# Patient Record
Sex: FEMALE
[De-identification: names, ages, dates, MRNs, and addresses within clinical notes are randomized per-mention and may not be internally consistent; named-entity substitution may affect disease eponyms.]

---

## 2020-07-02 ENCOUNTER — APPOINTMENT (OUTPATIENT)
Dept: ENDOCRINOLOGY | Facility: CLINIC | Age: 45
End: 2020-07-02
Payer: COMMERCIAL

## 2020-07-02 VITALS
DIASTOLIC BLOOD PRESSURE: 78 MMHG | HEART RATE: 74 BPM | SYSTOLIC BLOOD PRESSURE: 121 MMHG | BODY MASS INDEX: 22.66 KG/M2 | WEIGHT: 141 LBS | HEIGHT: 66 IN

## 2020-07-02 DIAGNOSIS — Z86.39 PERSONAL HISTORY OF OTHER ENDOCRINE, NUTRITIONAL AND METABOLIC DISEASE: ICD-10-CM

## 2020-07-02 DIAGNOSIS — Z78.9 OTHER SPECIFIED HEALTH STATUS: ICD-10-CM

## 2020-07-02 DIAGNOSIS — H81.10 BENIGN PAROXYSMAL VERTIGO, UNSPECIFIED EAR: ICD-10-CM

## 2020-07-02 PROCEDURE — 99204 OFFICE O/P NEW MOD 45 MIN: CPT

## 2020-07-02 RX ORDER — LEVOTHYROXINE SODIUM 0.05 MG/1
50 TABLET ORAL
Refills: 0 | Status: ACTIVE | COMMUNITY

## 2020-07-02 NOTE — REASON FOR VISIT
[Initial Evaluation] : an initial evaluation [Hypothyroidism] : hypothyroidism [Menopause Symptoms] : menopause symptoms [Other___] : [unfilled]

## 2020-07-02 NOTE — HISTORY OF PRESENT ILLNESS
[FreeTextEntry1] : 44 y.o. female who was found to have minimally elevated TSH (around 5 miu/ml) 11 yrs ago after pregnancy.\par No tx was initiated.\par Subsequently TSH was found to fluctuate between 9 and 11.\par Tx with levothyroxine 0.05 mg was initiated about 1 month ago.\par TSH was 1.5 on 7/1/2020.\par She also c/o amenorrhea (LMP 3/29/20).\par Lab. tests on 6/30 2020 are c/w menopause; PRL was 8.\par During the last 2 days she also developed positional vertigo and was prescribe Antivert (?) in the ER - improving.

## 2020-07-02 NOTE — PHYSICAL EXAM
[No Acute Distress] : no acute distress [Well Nourished] : well nourished [Alert] : alert [Normal Sclera/Conjunctiva] : normal sclera/conjunctiva [EOMI] : extra ocular movement intact [Well Developed] : well developed [No Proptosis] : no proptosis [Normal Oropharynx] : the oropharynx was normal [Thyroid Not Enlarged] : the thyroid was not enlarged [No Thyroid Nodules] : no palpable thyroid nodules [No Respiratory Distress] : no respiratory distress [No Accessory Muscle Use] : no accessory muscle use [Clear to Auscultation] : lungs were clear to auscultation bilaterally [Normal S1, S2] : normal S1 and S2 [Regular Rhythm] : with a regular rhythm [Normal Rate] : heart rate was normal [Normal Bowel Sounds] : normal bowel sounds [No Edema] : no peripheral edema [Pedal Pulses Normal] : the pedal pulses are present [Not Tender] : non-tender [Not Distended] : not distended [Soft] : abdomen soft [Normal Posterior Cervical Nodes] : no posterior cervical lymphadenopathy [Normal Anterior Cervical Nodes] : no anterior cervical lymphadenopathy [No Spinal Tenderness] : no spinal tenderness [No Stigmata of Cushings Syndrome] : no stigmata of Cushings Syndrome [Spine Straight] : spine straight [Normal Gait] : normal gait [Normal Strength/Tone] : muscle strength and tone were normal [Acanthosis Nigricans] : no acanthosis nigricans [No Rash] : no rash [No Tremors] : no tremors [Normal Reflexes] : deep tendon reflexes were 2+ and symmetric [Oriented x3] : oriented to person, place, and time

## 2020-07-02 NOTE — ASSESSMENT
[FreeTextEntry1] : Primary hypothyroidism; menopause; benign positional vertigo.\par \par Would obtain anti-thyroid AB\par Thyroid u/sound\par Bone density\par Levothyroxine 0.05 mg refilled\par Information re [place] thyroid provided at the patient's request\par F/u once the above results are available

## 2020-08-12 ENCOUNTER — TRANSCRIPTION ENCOUNTER (OUTPATIENT)
Age: 45
End: 2020-08-12

## 2020-08-13 ENCOUNTER — TRANSCRIPTION ENCOUNTER (OUTPATIENT)
Age: 45
End: 2020-08-13

## 2020-09-18 ENCOUNTER — TRANSCRIPTION ENCOUNTER (OUTPATIENT)
Age: 45
End: 2020-09-18

## 2020-09-22 ENCOUNTER — TRANSCRIPTION ENCOUNTER (OUTPATIENT)
Age: 45
End: 2020-09-22

## 2020-09-23 ENCOUNTER — TRANSCRIPTION ENCOUNTER (OUTPATIENT)
Age: 45
End: 2020-09-23

## 2020-10-20 ENCOUNTER — APPOINTMENT (OUTPATIENT)
Dept: ENDOCRINOLOGY | Facility: CLINIC | Age: 45
End: 2020-10-20

## 2020-10-23 ENCOUNTER — TRANSCRIPTION ENCOUNTER (OUTPATIENT)
Age: 45
End: 2020-10-23

## 2020-10-26 ENCOUNTER — TRANSCRIPTION ENCOUNTER (OUTPATIENT)
Age: 45
End: 2020-10-26

## 2020-10-27 ENCOUNTER — TRANSCRIPTION ENCOUNTER (OUTPATIENT)
Age: 45
End: 2020-10-27

## 2020-10-29 ENCOUNTER — TRANSCRIPTION ENCOUNTER (OUTPATIENT)
Age: 45
End: 2020-10-29

## 2020-11-16 ENCOUNTER — TRANSCRIPTION ENCOUNTER (OUTPATIENT)
Age: 45
End: 2020-11-16

## 2020-11-23 ENCOUNTER — TRANSCRIPTION ENCOUNTER (OUTPATIENT)
Age: 45
End: 2020-11-23

## 2020-11-25 ENCOUNTER — TRANSCRIPTION ENCOUNTER (OUTPATIENT)
Age: 45
End: 2020-11-25

## 2020-12-22 ENCOUNTER — APPOINTMENT (OUTPATIENT)
Dept: ENDOCRINOLOGY | Facility: CLINIC | Age: 45
End: 2020-12-22
Payer: COMMERCIAL

## 2020-12-22 DIAGNOSIS — Z00.00 ENCOUNTER FOR GENERAL ADULT MEDICAL EXAMINATION W/OUT ABNORMAL FINDINGS: ICD-10-CM

## 2020-12-22 PROCEDURE — 99214 OFFICE O/P EST MOD 30 MIN: CPT | Mod: 95

## 2020-12-22 NOTE — REVIEW OF SYSTEMS
[As Noted in HPI] : as noted in HPI [Negative] : Heme/Lymph [FreeTextEntry2] : joint pains and possible food allergies

## 2020-12-22 NOTE — REASON FOR VISIT
[Home] : at home, [unfilled] , at the time of the visit. [Medical Office: ([facility])___] : at the medical office located in  [Verbal consent obtained from patient] : the patient, [unfilled] [Hypothyroidism] : hypothyroidism

## 2020-12-22 NOTE — ASSESSMENT
[FreeTextEntry1] : The patient's thyroid hormone dose seems appropriate at this time - will continue same.\par Thyroid u/sound ordered.\par Referrals to rheumatologist and allergist.\par Medications refilled.\par F/u as needed.\par

## 2020-12-22 NOTE — HISTORY OF PRESENT ILLNESS
[FreeTextEntry1] : 44 y.o. female who was found to have minimally elevated TSH (around 5 miu/ml) 11 yrs ago after pregnancy.\par No tx was initiated.\par Subsequently TSH was found to fluctuate between 9 and 11.\par Tx with levothyroxine 0.05 mg was initiated about 1 month ago.\par TSH was 1.5 on 7/1/2020.\par She also c/o amenorrhea (LMP 3/29/20).\par Lab. tests on 6/30 2020 are c/w menopause; PRL was 8.\par During the last 2 days she also developed positional vertigo and was prescribe Antivert (?) in the ER - improving.\par 12/22/2020. The patient is now on 30 mg of [place] Thyroid daily.\par Her last TSH level was reportedly 3.92 about 2 weeks ago; fT4 was 0.83 on the same date.\par Her complaints at this time include generalized joint pains and unspecified food allergies.\par

## 2021-01-05 ENCOUNTER — TRANSCRIPTION ENCOUNTER (OUTPATIENT)
Age: 46
End: 2021-01-05

## 2021-01-06 ENCOUNTER — TRANSCRIPTION ENCOUNTER (OUTPATIENT)
Age: 46
End: 2021-01-06

## 2021-03-04 ENCOUNTER — TRANSCRIPTION ENCOUNTER (OUTPATIENT)
Age: 46
End: 2021-03-04

## 2021-03-10 ENCOUNTER — TRANSCRIPTION ENCOUNTER (OUTPATIENT)
Age: 46
End: 2021-03-10

## 2021-03-11 ENCOUNTER — APPOINTMENT (OUTPATIENT)
Dept: ENDOCRINOLOGY | Facility: CLINIC | Age: 46
End: 2021-03-11
Payer: COMMERCIAL

## 2021-03-11 ENCOUNTER — TRANSCRIPTION ENCOUNTER (OUTPATIENT)
Age: 46
End: 2021-03-11

## 2021-03-11 PROCEDURE — 99214 OFFICE O/P EST MOD 30 MIN: CPT | Mod: 95

## 2021-03-11 RX ORDER — LEVOTHYROXINE SODIUM 50 UG/1
50 TABLET ORAL DAILY
Qty: 90 | Refills: 3 | Status: ACTIVE | COMMUNITY
Start: 2021-03-11 | End: 1900-01-01

## 2021-03-11 NOTE — REASON FOR VISIT
[Home] : at home, [unfilled] , at the time of the visit. [Medical Office: ([facility])___] : at the medical office located in  [Verbal consent obtained from patient] : the patient, [unfilled] [Follow - Up] : a follow-up visit [Hypothyroidism] : hypothyroidism

## 2021-03-11 NOTE — HISTORY OF PRESENT ILLNESS
[FreeTextEntry1] : 44 y.o. female who was found to have minimally elevated TSH (around 5 miu/ml) 11 yrs ago after pregnancy.\par No tx was initiated.\par Subsequently TSH was found to fluctuate between 9 and 11.\par Tx with levothyroxine 0.05 mg was initiated about 1 month ago.\par TSH was 1.5 on 7/1/2020.\par She also c/o amenorrhea (LMP 3/29/20).\par Lab. tests on 6/30 2020 are c/w menopause; PRL was 8.\par During the last 2 days she also developed positional vertigo and was prescribe Antivert (?) in the ER - improving.\par 12/22/2020. The patient is now on 30 mg of [place] Thyroid daily.\par Her last TSH level was reportedly 3.92 about 2 weeks ago; fT4 was 0.83 on the same date.\par Her complaints at this time include generalized joint pains and unspecified food allergies.\par 3/11/21. The patient is doing well but c/o joint pains\par Her vertigo has resolved.\par She is now on 45 mg [place] Thyroid daily.\par TSH was 8.4 and FT4 0.71 on 3/5/21.t\par Anti -TPO and anti -TG AB are positive.\par

## 2021-03-11 NOTE — ASSESSMENT
[FreeTextEntry1] : Hashimoto's thyroiditis; hypothyroidism.\par Discussed the use of Synthroid vs [place] Thyroid.\par Will initiate Synthroid 0.05 mg and repeat TFTs in 4 weeks.\par Thyroid u/sound ordered.\par Rheumatology referral for joint pains.

## 2021-03-22 ENCOUNTER — APPOINTMENT (OUTPATIENT)
Dept: ENDOCRINOLOGY | Facility: CLINIC | Age: 46
End: 2021-03-22
Payer: COMMERCIAL

## 2021-04-29 ENCOUNTER — TRANSCRIPTION ENCOUNTER (OUTPATIENT)
Age: 46
End: 2021-04-29

## 2021-05-04 ENCOUNTER — TRANSCRIPTION ENCOUNTER (OUTPATIENT)
Age: 46
End: 2021-05-04

## 2021-05-04 ENCOUNTER — APPOINTMENT (OUTPATIENT)
Dept: ENDOCRINOLOGY | Facility: CLINIC | Age: 46
End: 2021-05-04
Payer: COMMERCIAL

## 2021-05-04 PROCEDURE — 99214 OFFICE O/P EST MOD 30 MIN: CPT | Mod: 95

## 2021-05-04 NOTE — ADDENDUM
[FreeTextEntry1] : 5/4/21 MK\par As discussed with Dr. Alberts and pt - [place] dose increased to\par 60 +15 mg - TDD; eRx sent; pt will repeat labs in 8 weeks.

## 2021-05-04 NOTE — HISTORY OF PRESENT ILLNESS
[FreeTextEntry1] : 44 y.o. female who was found to have minimally elevated TSH (around 5 miu/ml) 11 yrs ago after pregnancy.\par No tx was initiated.\par Subsequently TSH was found to fluctuate between 9 and 11.\par Tx with levothyroxine 0.05 mg was initiated about 1 month ago.\par TSH was 1.5 on 7/1/2020.\par She also c/o amenorrhea (LMP 3/29/20).\par Lab. tests on 6/30 2020 are c/w menopause; PRL was 8.\par During the last 2 days she also developed positional vertigo and was prescribe Antivert (?) in the ER - improving.\par 12/22/2020. The patient is now on 30 mg of [place] Thyroid daily.\par Her last TSH level was reportedly 3.92 about 2 weeks ago; fT4 was 0.83 on the same date.\par Her complaints at this time include generalized joint pains and unspecified food allergies.\par 3/11/21. The patient is doing well but c/o joint pains\par Her vertigo has resolved.\par She is now on 45 mg [place] Thyroid daily.\par TSH was 8.4 and FT4 0.71 on 3/5/21.t\par Anti -TPO and anti -TG AB are positive.\par 5/4/21. The patient is feeling well.\par She continues on 60 mg [place] thyroid daily.\par TSH was reportedly 3.6 about a week ago from [facility].\par Thyroid u/sound 4/16/21 - subcentimeter nodules.

## 2021-05-04 NOTE — ASSESSMENT
[FreeTextEntry1] : Hashimoto's thyroiditis.\par Hypothyroidism.\par Thyroid nodules.\par Will continue current tx.\par Repeat thyroid u/sound 6 months.\par Obtain bone density.\par Medications refilled.\par F/u - 6 months.

## 2021-07-08 ENCOUNTER — TRANSCRIPTION ENCOUNTER (OUTPATIENT)
Age: 46
End: 2021-07-08

## 2021-08-11 ENCOUNTER — TRANSCRIPTION ENCOUNTER (OUTPATIENT)
Age: 46
End: 2021-08-11

## 2021-08-30 ENCOUNTER — APPOINTMENT (OUTPATIENT)
Dept: ENDOCRINOLOGY | Facility: CLINIC | Age: 46
End: 2021-08-30
Payer: COMMERCIAL

## 2021-08-30 ENCOUNTER — NON-APPOINTMENT (OUTPATIENT)
Age: 46
End: 2021-08-30

## 2021-08-30 VITALS
DIASTOLIC BLOOD PRESSURE: 78 MMHG | SYSTOLIC BLOOD PRESSURE: 115 MMHG | BODY MASS INDEX: 23.4 KG/M2 | HEART RATE: 74 BPM | WEIGHT: 145 LBS

## 2021-08-30 DIAGNOSIS — E04.2 NONTOXIC MULTINODULAR GOITER: ICD-10-CM

## 2021-08-30 PROCEDURE — 99214 OFFICE O/P EST MOD 30 MIN: CPT

## 2021-08-30 RX ORDER — ERGOCALCIFEROL 1.25 MG/1
1.25 MG CAPSULE, LIQUID FILLED ORAL
Qty: 8 | Refills: 0 | Status: ACTIVE | COMMUNITY
Start: 2021-08-30 | End: 1900-01-01

## 2021-08-30 NOTE — REASON FOR VISIT
[Follow - Up] : a follow-up visit [Hypothyroidism] : hypothyroidism [Thyroid nodule/ MNG] : thyroid nodule/ MNG [Menopause Symptoms] : menopause symptoms [Other___] : [unfilled]

## 2021-08-30 NOTE — REVIEW OF SYSTEMS
[As Noted in HPI] : as noted in HPI [Negative] : Heme/Lymph [FreeTextEntry2] : joint pains, hot flashes

## 2021-08-30 NOTE — ASSESSMENT
[FreeTextEntry1] : Hahimoto's thyroiditis; hypothyroidism; thyroid nodules.\par Menopause.\par Vit D deficiency.\par Joint pains.\par \par Lab. tests today.\par For now continue same tx.\par f/U with GYN.\par Referral to rheumatology.\par F/U with me when the results of the above studies are available.\par Thyroid u/sound to be repeated in 4/2022.

## 2021-08-30 NOTE — HISTORY OF PRESENT ILLNESS
[FreeTextEntry1] : 44 y.o. female who was found to have minimally elevated TSH (around 5 miu/ml) 11 yrs ago after pregnancy.\par No tx was initiated.\par Subsequently TSH was found to fluctuate between 9 and 11.\par Tx with levothyroxine 0.05 mg was initiated about 1 month ago.\par TSH was 1.5 on 7/1/2020.\par She also c/o amenorrhea (LMP 3/29/20).\par Lab. tests on 6/30 2020 are c/w menopause; PRL was 8.\par During the last 2 days she also developed positional vertigo and was prescribe Antivert (?) in the ER - improving.\par 12/22/2020. The patient is now on 30 mg of [place] Thyroid daily.\par Her last TSH level was reportedly 3.92 about 2 weeks ago; fT4 was 0.83 on the same date.\par Her complaints at this time include generalized joint pains and unspecified food allergies.\par 3/11/21. The patient is doing well but c/o joint pains\par Her vertigo has resolved.\par She is now on 45 mg [place] Thyroid daily.\par TSH was 8.4 and FT4 0.71 on 3/5/21.t\par Anti -TPO and anti -TG AB are positive.\par 5/4/21. The patient is feeling well.\par She continues on 60 mg [place] thyroid daily.\par TSH was reportedly 3.6 about a week ago from [facility].\par Thyroid u/sound 4/16/21 - subcentimeter nodules.\par 8/30/21. The patient c/o joint pains and hot flashes.\par She is now on 60 mg of [place] thyroid daily.\par She was told by her psychiatrists that her Vit D level was low; she was started on Vit D supplementation, 50,000 IU once a week.

## 2021-08-31 ENCOUNTER — TRANSCRIPTION ENCOUNTER (OUTPATIENT)
Age: 46
End: 2021-08-31

## 2021-08-31 LAB
25(OH)D3 SERPL-MCNC: 33.9 NG/ML
ALBUMIN SERPL ELPH-MCNC: 4.4 G/DL
ALP BLD-CCNC: 43 U/L
ALT SERPL-CCNC: 31 U/L
ANION GAP SERPL CALC-SCNC: 11 MMOL/L
AST SERPL-CCNC: 21 U/L
BASOPHILS # BLD AUTO: 0.05 K/UL
BASOPHILS NFR BLD AUTO: 0.8 %
BILIRUB SERPL-MCNC: 0.2 MG/DL
BUN SERPL-MCNC: 15 MG/DL
CALCIUM SERPL-MCNC: 9.6 MG/DL
CHLORIDE SERPL-SCNC: 105 MMOL/L
CHOLEST SERPL-MCNC: 226 MG/DL
CO2 SERPL-SCNC: 27 MMOL/L
COVID-19 NUCLEOCAPSID  GAM ANTIBODY INTERPRETATION: NEGATIVE
COVID-19 SPIKE DOMAIN ANTIBODY INTERPRETATION: POSITIVE
CREAT SERPL-MCNC: 0.85 MG/DL
EOSINOPHIL # BLD AUTO: 0.06 K/UL
EOSINOPHIL NFR BLD AUTO: 0.9 %
ERYTHROCYTE [SEDIMENTATION RATE] IN BLOOD BY WESTERGREN METHOD: 8 MM/HR
ESTIMATED AVERAGE GLUCOSE: 114 MG/DL
ESTRADIOL SERPL-MCNC: <5 PG/ML
FSH SERPL-MCNC: 88.8 IU/L
GLUCOSE SERPL-MCNC: 125 MG/DL
HBA1C MFR BLD HPLC: 5.6 %
HCT VFR BLD CALC: 36.7 %
HDLC SERPL-MCNC: 58 MG/DL
HGB BLD-MCNC: 12.2 G/DL
IMM GRANULOCYTES NFR BLD AUTO: 0.2 %
LDLC SERPL CALC-MCNC: 149 MG/DL
LH SERPL-ACNC: 62.5 IU/L
LYMPHOCYTES # BLD AUTO: 2.18 K/UL
LYMPHOCYTES NFR BLD AUTO: 33.7 %
MAN DIFF?: NORMAL
MCHC RBC-ENTMCNC: 32.2 PG
MCHC RBC-ENTMCNC: 33.2 GM/DL
MCV RBC AUTO: 96.8 FL
MONOCYTES # BLD AUTO: 0.43 K/UL
MONOCYTES NFR BLD AUTO: 6.6 %
NEUTROPHILS # BLD AUTO: 3.74 K/UL
NEUTROPHILS NFR BLD AUTO: 57.8 %
NONHDLC SERPL-MCNC: 168 MG/DL
PLATELET # BLD AUTO: 228 K/UL
POTASSIUM SERPL-SCNC: 4 MMOL/L
PROT SERPL-MCNC: 7.2 G/DL
RBC # BLD: 3.79 M/UL
RBC # FLD: 12.9 %
RHEUMATOID FACT SER QL: <10 IU/ML
SARS-COV-2 AB SERPL IA-ACNC: >250 U/ML
SARS-COV-2 AB SERPL QL IA: 0.11 INDEX
SODIUM SERPL-SCNC: 143 MMOL/L
T3 SERPL-MCNC: 96 NG/DL
T4 FREE SERPL-MCNC: 0.8 NG/DL
THYROGLOB AB SERPL-ACNC: 45.5 IU/ML
THYROPEROXIDASE AB SERPL IA-ACNC: 136 IU/ML
TRIGL SERPL-MCNC: 96 MG/DL
TSH SERPL-ACNC: 1.66 UIU/ML
WBC # FLD AUTO: 6.47 K/UL

## 2021-09-01 ENCOUNTER — TRANSCRIPTION ENCOUNTER (OUTPATIENT)
Age: 46
End: 2021-09-01

## 2021-09-02 ENCOUNTER — TRANSCRIPTION ENCOUNTER (OUTPATIENT)
Age: 46
End: 2021-09-02

## 2021-11-04 ENCOUNTER — TRANSCRIPTION ENCOUNTER (OUTPATIENT)
Age: 46
End: 2021-11-04

## 2021-12-29 ENCOUNTER — TRANSCRIPTION ENCOUNTER (OUTPATIENT)
Age: 46
End: 2021-12-29

## 2022-01-05 ENCOUNTER — RX RENEWAL (OUTPATIENT)
Age: 47
End: 2022-01-05

## 2022-02-17 ENCOUNTER — TRANSCRIPTION ENCOUNTER (OUTPATIENT)
Age: 47
End: 2022-02-17

## 2022-02-17 DIAGNOSIS — Z13.29 ENCOUNTER FOR SCREENING FOR OTHER SUSPECTED ENDOCRINE DISORDER: ICD-10-CM

## 2022-02-17 DIAGNOSIS — E55.9 VITAMIN D DEFICIENCY, UNSPECIFIED: ICD-10-CM

## 2022-02-17 DIAGNOSIS — Z13.220 ENCOUNTER FOR SCREENING FOR LIPOID DISORDERS: ICD-10-CM

## 2022-02-17 DIAGNOSIS — Z11.59 ENCOUNTER FOR SCREENING FOR OTHER VIRAL DISEASES: ICD-10-CM

## 2022-02-17 DIAGNOSIS — M25.50 PAIN IN UNSPECIFIED JOINT: ICD-10-CM

## 2022-02-17 DIAGNOSIS — Z78.0 ASYMPTOMATIC MENOPAUSAL STATE: ICD-10-CM

## 2022-02-17 DIAGNOSIS — E03.9 HYPOTHYROIDISM, UNSPECIFIED: ICD-10-CM

## 2022-03-29 ENCOUNTER — APPOINTMENT (OUTPATIENT)
Dept: ENDOCRINOLOGY | Facility: CLINIC | Age: 47
End: 2022-03-29

## 2022-04-11 PROBLEM — Z11.59 SCREENING FOR VIRAL DISEASE: Status: ACTIVE | Noted: 2021-08-30

## 2022-04-12 ENCOUNTER — NON-APPOINTMENT (OUTPATIENT)
Age: 47
End: 2022-04-12

## 2022-04-20 ENCOUNTER — TRANSCRIPTION ENCOUNTER (OUTPATIENT)
Age: 47
End: 2022-04-20

## 2022-04-20 RX ORDER — THYROID, PORCINE 15 MG/1
15 TABLET ORAL
Qty: 45 | Refills: 3 | Status: ACTIVE | COMMUNITY
Start: 2021-01-05 | End: 1900-01-01

## 2022-04-20 RX ORDER — THYROID, PORCINE 60 MG/1
60 TABLET ORAL
Qty: 90 | Refills: 3 | Status: ACTIVE | COMMUNITY
Start: 2020-07-02 | End: 1900-01-01

## 2022-05-09 ENCOUNTER — APPOINTMENT (OUTPATIENT)
Dept: ENDOCRINOLOGY | Facility: CLINIC | Age: 47
End: 2022-05-09

## 2022-05-20 DIAGNOSIS — E06.3 AUTOIMMUNE THYROIDITIS: ICD-10-CM

## 2022-06-28 ENCOUNTER — APPOINTMENT (OUTPATIENT)
Dept: ENDOCRINOLOGY | Facility: CLINIC | Age: 47
End: 2022-06-28

## 2022-07-21 ENCOUNTER — APPOINTMENT (OUTPATIENT)
Dept: ENDOCRINOLOGY | Facility: CLINIC | Age: 47
End: 2022-07-21